# Patient Record
Sex: FEMALE | Race: WHITE
[De-identification: names, ages, dates, MRNs, and addresses within clinical notes are randomized per-mention and may not be internally consistent; named-entity substitution may affect disease eponyms.]

---

## 2019-09-30 ENCOUNTER — HOSPITAL ENCOUNTER (INPATIENT)
Dept: HOSPITAL 74 - JSAMEDAYSX | Age: 39
LOS: 2 days | Discharge: HOME | DRG: 513 | End: 2019-10-02
Attending: OBSTETRICS & GYNECOLOGY | Admitting: OBSTETRICS & GYNECOLOGY
Payer: COMMERCIAL

## 2019-09-30 VITALS — BODY MASS INDEX: 28.1 KG/M2

## 2019-09-30 DIAGNOSIS — N92.0: Primary | ICD-10-CM

## 2019-09-30 DIAGNOSIS — D25.9: ICD-10-CM

## 2019-09-30 DIAGNOSIS — R10.2: ICD-10-CM

## 2019-09-30 PROCEDURE — 0UT90ZZ RESECTION OF UTERUS, OPEN APPROACH: ICD-10-PCS | Performed by: OBSTETRICS & GYNECOLOGY

## 2019-09-30 PROCEDURE — 0UT70ZZ RESECTION OF BILATERAL FALLOPIAN TUBES, OPEN APPROACH: ICD-10-PCS | Performed by: OBSTETRICS & GYNECOLOGY

## 2019-09-30 RX ADMIN — SODIUM CHLORIDE, POTASSIUM CHLORIDE, SODIUM LACTATE AND CALCIUM CHLORIDE SCH MLS/HR: 600; 310; 30; 20 INJECTION, SOLUTION INTRAVENOUS at 17:00

## 2019-09-30 RX ADMIN — CEFAZOLIN SODIUM SCH MLS/HR: 2 SOLUTION INTRAVENOUS at 18:59

## 2019-09-30 NOTE — PN
Progress Note (SOAP)





- Subjective


History of Present Illness: 





Patient reports mild pain


Using PCA


Denies flatus


Reports no bleeding


+ sarkar, draining clear fluid


Tolerating clears





- Current Medications


Current Medications: 


Active Medications





Enoxaparin Sodium (Lovenox -)  40 mg SQ DAILY [facility]


Hydromorphone HCl (Hydromorphone 10 Mg/50 Ml-Ns)  10 mg PCA PCA HERMELINDA; Protocol


   Stop: 10/07/19 12:15


   Last Admin: 09/30/19 12:45 Dose:  10 mg


Cefazolin Sodium/Dextrose (Ancef 2 Gm Premixed Ivpb -)  2 gm in 50 mls @ 3 mls/

hr IVPB Q8HIV [facility]


   Stop: 10/01/19 09:59


   Last Admin: 09/30/19 18:59 Dose:  3 mls/hr


Lactated Ringer's (Lactated Ringers Solution)  1,000 ml in 1,000 mls @ 125 mls/

hr IV ASDIR [facility]


   Last Admin: 09/30/19 17:00 Dose:  125 mls/hr


Ondansetron HCl (Zofran Injection)  4 mg IVPUSH Q6H PRN


   PRN Reason: NAUSEA AND/OR VOMITING











- Objective


Vital Signs: 


 Vital Signs











Temperature  98 F   09/30/19 14:45


 


Pulse Rate  86   09/30/19 16:45


 


Respiratory Rate  18   09/30/19 16:45


 


Blood Pressure  107/59 L  09/30/19 16:45


 


O2 Sat by Pulse Oximetry (%)  100   09/30/19 16:45











Constitutional: Yes: No Distress, Calm


Cardiovascular: Yes: Regular Rate and Rhythm


Respiratory: Yes: Regular, CTA Bilaterally


Gastrointestinal: Yes: Soft, Tenderness (mild incisoinal).  No: Distention


Integumentary: Yes: WNL


Wound/Incision: Yes: Dressing Dry and Intact


Neurological: Yes: Alert, Oriented


Psychiatric: Yes: WNL





Assessment/Plan





40 yo POD # 0 s/p ELENI, Bilateral salpingectomy via pfannensteil incision, 

afebrile, vital signs stable, doing well 





1. Continue routine postoperative care. 


2. ID - afebrile, will continue to monitor vital signs


Currently on ancef Q 8 x 24 H 


3. Cardiovascular - No acute issues


4. Pulmonary - Encourage incentive spirometer


5. Hematology - will f/u AM CBC 


Will continue to monitor vital signs of anemia


6. Urinary -Sarkar in place, draining clear fluid


Plan to DC in AM


Will f/u BMP for Cr in AM 


7. Gastroinestinal - tolerating clears, no signs of ileus at this time


Encouraged ambulation in AM


Plan to advance diet as tolerated


8. Gynecology - will follow up pathology


9. Anticipate discharge home  POD # 2, pending able to ambulate, adequate pain 

control and urinating without issue.

## 2019-09-30 NOTE — OP
DATE OF OPERATION:  2019

 

ATTENDING PHYSICIAN RESPONSIBLE FOR SIGNING REPORT:  Enid Villarreal MD

 

PREOPERATIVE DIAGNOSES:  Menorrhagia, pelvic pain, fibroid uterus.

 

POSTOPERATIVE DIAGNOSES:  Menorrhagia, pelvic pain, fibroid uterus.

 

SURGEON:  Enid Villarreal MD

 

ASSISTANT:  Kehinde Ibanez MD

 

ANESTHESIOLOGY:  Leora Waldrop, REF-CRN

 

ANESTHESIA:  General.

 

SPECIMENS REMOVED:  Uterus, bilateral fallopian tubes.

 

ESTIMATED BLOOD LOSS:  100.

 

FLUID GIVEN:  1800.

 

 

URINE OUTPUT:  100 mL at the end of the case.

 

FINDINGS:  Normal-appearing bilateral fallopian tubes and ovaries, a large 
fibroid

uterus.

 

INDICATIONS:  The patient is a 39-year-old,  3, para 3, with history of

menorrhagia, fibroid uterus, failing medical intervention, desiring definitive

surgical treatment.  She was counseled regarding risks, benefits, alternatives, 
and

complications of procedure including infection, bleeding, damage to surrounding

organs such as bowel, bladder or ureter.  She expressed understanding. 

 

DESCRIPTION OF PROCEDURE: She was brought to the operating room. When 
anesthesia 

was found to be adequate, patient was prepped and draped in normal sterile 
fashion, 

placed in dorsal positioning.  An approximately 8-cm skin incision was made 
with a knife and

carried down to the underlying rectus fascia using Bovie electrocautery.  The 
fascia

was nicked in midline, extended laterally using the Bovie electrocautery.  
Inferior

portion of the fascial incision was tented up using Kocher clamps, dissected off

underlying rectus muscle using Garcia scissors.  Attention was brought to the 
superior

portion of the fascial incision, where in a similar fashion it was tented up 
using

Kocher clamps, dissected off underlying rectus muscle using the Garcia scissors.  
The

rectus muscles were  in the midline.  The peritoneum was entered 
sharply and

the uterus was identified, exteriorized.  



Attention was brought to the right round ligament, which was tagged using 0 
Vicryl and 

ligated with the LigaSure.  Attention was brought to the left round ligament, 
which was 

tagged and ligated using the LigaSure.  The anterior leaf of the broad ligament 
was entered

and dissected  to the level of the internal os sharply using Metzenbaum 
scissors.  Attention was

brought to the right adnexa.  The right broad ligament was entered sharply and 
the

uteroovarian ligament was doubly clamped and suture ligated using 0 Vicryl.  
Attention was 

brought to the left adnexa.  The uteroovarian ligament was identified, doubly 
clamped, and

was suture ligated using 0 Vicryl.  The right fallopian tube was then removed 
using

the LigaSure and sent to Pathology.  



The left uterine vessels were skeletonized.  The uterine vessels were doubly 
clamped using Al 

clamps, sutured and ligated using 0 Vicryl.  Additional pedicles were then 
taken down in a stepwise 

fashion down laterally to the cervix to the level of the external cervical os, 
and this was

ligated using the 0 Vicryl.  Attention was brought to the right uterine vessels,

which were skeletonized and which were doubly clamped and suture ligated using 0

Vicryl.  Additional pedicles taken down using Al clamps to the level of 
external cervical os. 

The vaginal cuff was then made using Garcia scissors, and the specimen was sent 
to Pathology. 

The cervical cuff was then closed using 0 Vicryl in running fashion.  The left 
fallopian tube was 

ligated using the LigaSure and sent to Pathology.  Examination of the pedicles 
revealed bleeding 

from the posterior peritoneum.  The anterior and posterior peritoneum was then 
reapproximated using 0

Vicryl in a running fashion.  Good hemostasis was noted.  



Copious irrigation was performed.  The peritoneum was then closed using 0 
Vicryl in a running 

fashion.  The fascia was closed using 0 Vicryl in running fashion.  The 
subcutaneous fat 

was closed using 0 Vicryl in a running fashion.  The skin was reapproximated 
using 3-0 Vicryl. 

The patient tolerated the procedure well.  Estimated blood loss was 100 mL.  
Patient

was brought to recovery room in stable condition.

 

 

 

ELISEO [id number]

DD: 2019 12:17

DT: 2019 13:03

Job #:  [id number]

[id number]

## 2019-09-30 NOTE — OP
Operative Note





- Note:


Operative Date: 09/30/19


Pre-Operative Diagnosis: menorrhagia, pelvic pain, fibroid uterus


Operation: total abdominal hysterectomy, bilateral salpingectomy via 

pfannensteil incision


Findings: 





fibroid uterus, normal appearing fallopian tubes and ovaries bilaterally 


Post-Operative Diagnosis: Same as Pre-op


Surgeon: Enid Villarreal


Assistant: Kehinde Ibanez


Anesthesiologist/CRNA: Leora Waldrop


Anesthesia: General


Specimens Removed: uterus bilateral fallopian tubes


Estimated Blood Loss (mls): 100


Drains, Volume Out (mls): 100 (urine)


Fluid Volume Replaced (mls): 1,800

## 2019-10-01 LAB
ANION GAP SERPL CALC-SCNC: 5 MMOL/L (ref 8–16)
BUN SERPL-MCNC: 10 MG/DL (ref 7–18)
CALCIUM SERPL-MCNC: 7.8 MG/DL (ref 8.5–10.1)
CHLORIDE SERPL-SCNC: 105 MMOL/L (ref 98–107)
CO2 SERPL-SCNC: 29 MMOL/L (ref 21–32)
CREAT SERPL-MCNC: 0.7 MG/DL (ref 0.55–1.3)
DEPRECATED RDW RBC AUTO: 14.3 % (ref 11.6–15.6)
GLUCOSE SERPL-MCNC: 102 MG/DL (ref 74–106)
HCT VFR BLD CALC: 28.7 % (ref 32.4–45.2)
HGB BLD-MCNC: 9.7 GM/DL (ref 10.7–15.3)
MCH RBC QN AUTO: 28.5 PG (ref 25.7–33.7)
MCHC RBC AUTO-ENTMCNC: 33.9 G/DL (ref 32–36)
MCV RBC: 84.1 FL (ref 80–96)
PLATELET # BLD AUTO: 202 K/MM3 (ref 134–434)
PMV BLD: 9.3 FL (ref 7.5–11.1)
POTASSIUM SERPLBLD-SCNC: 4.2 MMOL/L (ref 3.5–5.1)
RBC # BLD AUTO: 3.41 M/MM3 (ref 3.6–5.2)
SODIUM SERPL-SCNC: 139 MMOL/L (ref 136–145)
WBC # BLD AUTO: 5.4 K/MM3 (ref 4–10)

## 2019-10-01 RX ADMIN — ENOXAPARIN SODIUM SCH MG: 40 INJECTION SUBCUTANEOUS at 09:41

## 2019-10-01 RX ADMIN — CEFAZOLIN SODIUM SCH MLS/HR: 2 SOLUTION INTRAVENOUS at 02:20

## 2019-10-01 RX ADMIN — SODIUM CHLORIDE, POTASSIUM CHLORIDE, SODIUM LACTATE AND CALCIUM CHLORIDE SCH MLS/HR: 600; 310; 30; 20 INJECTION, SOLUTION INTRAVENOUS at 02:38

## 2019-10-01 NOTE — PN
Progress Note (SOAP)





- Subjective


History of Present Illness: 





Patient reports mild pain


Using PCA, controlling pain well 


Denies flatus


Reports no bleeding


+ sarkar, draining clear fluid


Tolerating clears denies nausea or vomiting


No ambulation yet 





- Current Medications


Current Medications: 


Active Medications





Enoxaparin Sodium (Lovenox -)  40 mg SQ DAILY [facility]


Hydromorphone HCl (Hydromorphone 10 Mg/50 Ml-Ns)  10 mg PCA PCA HERMELINDA; Protocol


   Stop: 10/07/19 12:15


   Last Admin: 09/30/19 12:45 Dose:  10 mg


Cefazolin Sodium/Dextrose (Ancef 2 Gm Premixed Ivpb -)  2 gm in 50 mls @ 3 mls/

hr IVPB Q8HIV [facility]


   Stop: 10/01/19 09:59


   Last Admin: 10/01/19 02:20 Dose:  3 mls/hr


Lactated Ringer's (Lactated Ringers Solution)  1,000 ml in 1,000 mls @ 125 mls/

hr IV ASDIR HERMELINDA


   Last Admin: 10/01/19 02:38 Dose:  125 mls/hr


Ondansetron HCl (Zofran Injection)  4 mg IVPUSH Q6H PRN


   PRN Reason: NAUSEA AND/OR VOMITING











- Objective


Vital Signs: 


 Vital Signs











Temperature  99.0 F   10/01/19 05:55


 


Pulse Rate  84   10/01/19 05:55


 


Respiratory Rate  20   10/01/19 05:55


 


Blood Pressure  109/60   10/01/19 05:55


 


O2 Sat by Pulse Oximetry (%)  100   09/30/19 21:00














Assessment/Plan





40 yo POD # 1 s/p ELENI, Bilateral salpingectomy via pfannensteil incision, 

afebrile, vital signs stable, doing well 





1. Continue routine postoperative care. 


2. ID - afebrile, will continue to monitor vital signs


Currently on ancef Q 8 x 24 H 


3. Cardiovascular - No acute issues


4. Pulmonary - Encourage incentive spirometer


5. Hematology - will f/u AM CBC 


Will continue to monitor vital signs of anemia


6. Urinary -Sarkar in place, draining clear fluid


Plan to DC today


Will f/u BMP for Cr


7. Gastroinestinal - tolerating clears, no signs of ileus at this time


Encouraged ambulation


Plan to advance diet as tolerated


8. Gynecology - will follow up pathology


9. Anticipate discharge home POD # 2, pending able to ambulate, adequate pain 

control and urinating without issue.

## 2019-10-01 NOTE — PN
Progress Note (short form)





- Note


Progress Note: 





39 F s/p ELENI POD 1


PCA d/c today by surgical team. pt c/o pain. taking PO meds.


good result of anesthetic care


cont present meds and plan

## 2019-10-02 VITALS — HEART RATE: 90 BPM | DIASTOLIC BLOOD PRESSURE: 64 MMHG | TEMPERATURE: 98 F | SYSTOLIC BLOOD PRESSURE: 116 MMHG

## 2019-10-02 RX ADMIN — ENOXAPARIN SODIUM SCH MG: 40 INJECTION SUBCUTANEOUS at 09:42

## 2019-10-02 NOTE — PN
Progress Note (SOAP)





- Subjective


History of Present Illness: 





Patient comfortable, pain well controlled with PO meds


Ambulating, voiding, passing gas


Denies headache, chest pain, shortness of breath





- Current Medications


Current Medications: 


Active Medications





Acetaminophen (Tylenol -)  650 mg PO Q6H PRN


   PRN Reason: FEVER


Enoxaparin Sodium (Lovenox -)  40 mg SQ DAILY HERMELINDA


   Last Admin: 10/02/19 09:42 Dose:  40 mg


Ibuprofen (Motrin -)  600 mg PO Q6H PRN


   PRN Reason: FEVER; IF TYLENOL NOT WRK


   Last Admin: 10/01/19 12:27 Dose:  600 mg


Ondansetron HCl (Zofran Injection)  4 mg IVPUSH Q6H PRN


   PRN Reason: NAUSEA AND/OR VOMITING


Oxycodone HCl (Roxicodone -)  10 mg PO Q4H PRN


   PRN Reason: PAIN 7-10


   Last Admin: 10/02/19 06:47 Dose:  10 mg


Oxycodone HCl (Roxicodone -)  5 mg PO Q4H PRN


   PRN Reason: PAIN 1-6











- Objective


Vital Signs: 


 Vital Signs











Temperature  98.8 F   10/02/19 06:00


 


Pulse Rate  80   10/02/19 06:00


 


Respiratory Rate  18   10/02/19 03:31


 


Blood Pressure  99/54 L  10/02/19 06:00


 


O2 Sat by Pulse Oximetry (%)  97   10/01/19 21:00











Constitutional: Yes: No Distress, Calm


Cardiovascular: Yes: Regular Rate and Rhythm


Respiratory: Yes: Regular, CTA Bilaterally


Gastrointestinal: Yes: Normal Bowel Sounds, Soft, Tenderness (mild incisional 

tenderness)


Extremities: Yes: WNL


Edema: No


Wound/Incision: Yes: Clean/Dry, Well Approximated, Dressing Removed


Neurological: Yes: Alert, Oriented


Psychiatric: Yes: Alert, Oriented





Labs


Lab Results: 


 CBC, BMP





 10/01/19 06:45 





 10/01/19 06:45 











Assessment/Plan





38 yo POD # 2 s/p ELENI, Bilateral salpingectomy via pfannensteil incision, 

afebrile, vital signs stable, doing well 





1. Continue routine postoperative care. 


2. ID - afebrile, no signs of infection, s/p ancef 


3. Cardiovascular - No acute issues


4. Pulmonary - Encourage incentive spirometer


5. Hematology - AM CBC Stable


6. Urinary -Cr stable (preop 0.8 6/2019), voiding no issue


7. Gastroinestinal - no signs of ileus, tolerating PO wel 


8. Gynecology - will follow up pathology


9. Anticipate discharge home today with precautions


Rx sent to pharmacy


To RTO in 1-2 wk for incision check.

## 2019-10-02 NOTE — DS
Physical Exam-GYN


Vital Signs: 


 Vital Signs











Temperature  98.8 F   10/02/19 06:00


 


Pulse Rate  80   10/02/19 06:00


 


Respiratory Rate  18   10/02/19 03:31


 


Blood Pressure  99/54 L  10/02/19 06:00


 


O2 Sat by Pulse Oximetry (%)  97   10/01/19 21:00











Labs: 


 CBC, BMP





 10/01/19 06:45 





 10/01/19 06:45 











Discharge Summary


Problems reviewed: Yes


Reason For Visit: PELVIC PAIN, FIBROIDS UTERUS


Current Active Problems





Fibroid (Acute)


Menorrhagia (Acute)


Pelvic pain (Acute)


S/P total abdominal hysterectomy (Acute)








Procedures: Principal: Total abdominal hysterectomy, bilateral salpingectomy 

via pfannensteil incision


Hospital Course: 


Patient underwent ELENI/Bilateral salpingectomy via Pfannensteil Incision. 


POD # 1 Garza DC'd, patient progressed to regular diet, ambulated and voided 

without issue


CBC without anemia, Adequate UOP, Cr stable


Stable For DC home without issue on POD #2 with precautions


Condition: Good





- Instructions


Diet, Activity, Other Instructions: 


RTO in 1 week 





Physical activity


Resume your normal everyday activity as tolerated no heavy lifting or exercise 

until seen by your surgeon. You may walk unlimited carlos enrique of and climb stairs. 

You may resume driving the car when you feel safe and comfortable behind the 

wheel. No sexual activity as instructed.





Wound care


If you have a bandage, leave it on, and keep dry for 48-72 hours. After that 

time discard the outer bandage. If they are tapes on the skin under the out of 

bandage leave them in place. They will peel off in the next 7 to 10 days. Do 

Not Peel them off. You may shower the day after surgery. If there are tapes 

present on the skin, you may shower over them.





Diet


There are no dietary restrictions. Eat healthy, high-fiber foods. Drink 6 to 8 

glasses of liquid each day. This will assist in keeping your bowels are regular.





Pain management


You may take Tylenol or acetaminophen or Ibuprofen (for example, Motrin, Advil 

etc.)  for pain; Prescription pain medication has been sent to your pharmacy 





Call MD for any of the following:


Severe pain not relieved by medication


Fever of 101 or higher


Excessive bleeding or drainage on dressing


Inability to urinate

















Referrals: 


Enid Villarreal MD [Staff Physician] - 


Disposition: HOME





- Home Medications


Comprehensive Discharge Medication List: 


Ambulatory Orders





Oxycodone HCl/Acetaminophen [Percocet 5-325 mg Tablet] 1 tab PO Q6H #10 tablet 

MDD 4 10/02/19 








Prescription Drug Monitoring Program (I-STOP) results: I-STOP reviewed and no 

issues identified ( Reference #: [id number])

## 2019-10-03 NOTE — PATH
Surgical Pathology Report



Patient Name:  DEB TRIMBLE

Accession #:  [id number]

[facility]. Rec. #:  [id number]                                                        

   /Age/Gender:  1980 (Age: 39) / F

Account:  [id number]                                                          

             Location: [address] [place] [address]

Taken:  2019

Received:  2019

Reported:  10/2/2019

Physicians:  Enid Villarreal

  



Specimen(s) Received

 TOTAL HYSTERECTOMY 





Clinical History

Pelvic pain, fibroids uterus







Final Diagnosis

UTERUS, CERVIX, BILATERAL FALLOPIAN TUBES, HYSTERECTOMY AND BILATERAL

SALPINGECTOMY:

LEIOMYOMATA WITH FOCAL DEGENERATIVE CHANGE.

WEAKLY PROLIFERATIVE ENDOMETRIUM.

CERVIX WITH SQUAMOUS METAPLASIA AND FOCAL MILD CHRONIC CERVICITIS.

BILATERAL FALLOPIAN TUBES WITH NO SIGNIFICANT PATHOLOGIC CHANGE.





***Electronically Signed***

Sean Rodriguez M.D.





Gross Description

Received in formalin labeled "uterus and cervix, bilateral fallopian tubes," is

a 271 g uterus with an attached cervix and bilateral attached fallopian tubes.

The specimen measures 11.5 cm from superior to inferior, 9 cm from anterior to

posterior, and 5.5 cm from left to right. The serosa is tan-pink and smooth. The

attached cervix measures3 cm in length and averages 3.3 cm in diameter. The

ectocervix is tan-pink, smooth and glistening. The endocervix is unremarkable.

The endometrial cavity measures 4.5 cm in length and averages 4.0 cm from cornu

to cornu. The endometrium is tan-red and measures up to 0.1 cm in thickness. The

myometrium displays multiple submucosal, intramural , and subserosal nodules,

measuring from 0.3 cm to 3.8 cm in greatest dimension. The cut surface of the

nodules is tan and rubbery with whorled architecture. The remaining myometrium

is tan-pink and measures up to 2.2 cm in thickness. The left fimbriated

fallopian tube measures 5.0 cm in length. The outer surface is gray purple and

smooth. Sectioning reveals an unremarkable lumen. The right fimbriated fallopian

tube measures 5.0 cm in length. The outer surface is gray purple and smooth.

Sectioning reveals an unremarkable lumen. Representative sections are submitted

in 15 cassettes as follows: 1-9-kyxaqxzk cervix; 3-4-posterior cervix;

6-7-vlxkgqpx endomyometrium; 7-8-posterior endomyometrium; 9-10-largest

intramural nodule; 11- submucosal nodule; 12- intramural nodule 13- subserosal

nodule 14- one portion of fallopian tube; 15- other portion of fallopian tube.



[email or web address]